# Patient Record
Sex: MALE | Race: WHITE | NOT HISPANIC OR LATINO | Employment: FULL TIME | URBAN - METROPOLITAN AREA
[De-identification: names, ages, dates, MRNs, and addresses within clinical notes are randomized per-mention and may not be internally consistent; named-entity substitution may affect disease eponyms.]

---
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
## 2023-04-10 PROBLEM — F31.81 BIPOLAR II DISORDER (MULTI): Status: ACTIVE | Noted: 2023-04-10

## 2023-04-10 PROBLEM — E66.01 CLASS 2 SEVERE OBESITY WITH SERIOUS COMORBIDITY AND BODY MASS INDEX (BMI) OF 36.0 TO 36.9 IN ADULT (MULTI): Status: ACTIVE | Noted: 2023-04-10

## 2023-04-10 PROBLEM — L60.8 PITTING OF NAILS: Status: ACTIVE | Noted: 2023-04-10

## 2023-04-10 PROBLEM — S39.012A STRAIN OF LUMBAR REGION: Status: ACTIVE | Noted: 2023-04-10

## 2023-04-10 PROBLEM — E66.812 CLASS 2 SEVERE OBESITY WITH SERIOUS COMORBIDITY AND BODY MASS INDEX (BMI) OF 36.0 TO 36.9 IN ADULT: Status: ACTIVE | Noted: 2023-04-10

## 2023-04-10 PROBLEM — L03.019 PARONYCHIA OF FINGER: Status: ACTIVE | Noted: 2023-04-10

## 2023-04-10 PROBLEM — R53.83 FATIGUE: Status: ACTIVE | Noted: 2023-04-10

## 2023-04-10 PROBLEM — S93.409A ANKLE SPRAIN: Status: ACTIVE | Noted: 2023-04-10

## 2023-04-10 PROBLEM — M25.579 ANKLE PAIN: Status: ACTIVE | Noted: 2023-04-10

## 2023-04-10 RX ORDER — MUPIROCIN 20 MG/G
OINTMENT TOPICAL 2 TIMES DAILY
COMMUNITY
Start: 2022-12-14

## 2023-04-10 RX ORDER — QUETIAPINE FUMARATE 400 MG/1
1 TABLET, FILM COATED ORAL NIGHTLY
COMMUNITY
End: 2023-11-08

## 2023-11-08 DIAGNOSIS — F31.10 BIPOLAR DISORDER, CURRENT EPISODE MANIC WITHOUT PSYCHOTIC FEATURES, UNSPECIFIED (MULTI): ICD-10-CM

## 2023-11-08 RX ORDER — QUETIAPINE FUMARATE 400 MG/1
400 TABLET, FILM COATED ORAL NIGHTLY
Qty: 90 TABLET | Refills: 3 | Status: SHIPPED | OUTPATIENT
Start: 2023-11-08

## 2024-11-10 DIAGNOSIS — F31.10 BIPOLAR DISORDER, CURRENT EPISODE MANIC WITHOUT PSYCHOTIC FEATURES, UNSPECIFIED (MULTI): ICD-10-CM

## 2024-11-11 RX ORDER — QUETIAPINE FUMARATE 400 MG/1
400 TABLET, FILM COATED ORAL NIGHTLY
Qty: 90 TABLET | Refills: 3 | Status: SHIPPED | OUTPATIENT
Start: 2024-11-11

## 2025-01-22 ENCOUNTER — APPOINTMENT (OUTPATIENT)
Dept: PRIMARY CARE | Facility: CLINIC | Age: 55
End: 2025-01-22
Payer: COMMERCIAL

## 2025-01-22 VITALS
WEIGHT: 295 LBS | SYSTOLIC BLOOD PRESSURE: 152 MMHG | OXYGEN SATURATION: 95 % | RESPIRATION RATE: 18 BRPM | HEART RATE: 62 BPM | DIASTOLIC BLOOD PRESSURE: 92 MMHG | BODY MASS INDEX: 36.68 KG/M2 | HEIGHT: 75 IN

## 2025-01-22 DIAGNOSIS — F43.10 PTSD (POST-TRAUMATIC STRESS DISORDER): Primary | ICD-10-CM

## 2025-01-22 DIAGNOSIS — J06.9 URI, ACUTE: ICD-10-CM

## 2025-01-22 DIAGNOSIS — F31.10 BIPOLAR DISORDER, CURRENT EPISODE MANIC WITHOUT PSYCHOTIC FEATURES, UNSPECIFIED (MULTI): ICD-10-CM
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
## 2025-01-22 PROBLEM — S93.409A ANKLE SPRAIN: Status: RESOLVED | Noted: 2023-04-10 | Resolved: 2025-01-22

## 2025-01-22 PROBLEM — L03.019 PARONYCHIA OF FINGER: Status: RESOLVED | Noted: 2023-04-10 | Resolved: 2025-01-22

## 2025-01-22 PROBLEM — M25.579 ANKLE PAIN: Status: RESOLVED | Noted: 2023-04-10 | Resolved: 2025-01-22

## 2025-01-22 PROCEDURE — 1036F TOBACCO NON-USER: CPT | Performed by: FAMILY MEDICINE

## 2025-01-22 PROCEDURE — 99213 OFFICE O/P EST LOW 20 MIN: CPT | Performed by: FAMILY MEDICINE

## 2025-01-22 PROCEDURE — 3008F BODY MASS INDEX DOCD: CPT | Performed by: FAMILY MEDICINE

## 2025-01-22 ASSESSMENT — PATIENT HEALTH QUESTIONNAIRE - PHQ9
1. LITTLE INTEREST OR PLEASURE IN DOING THINGS: NOT AT ALL
SUM OF ALL RESPONSES TO PHQ9 QUESTIONS 1 AND 2: 0
2. FEELING DOWN, DEPRESSED OR HOPELESS: NOT AT ALL

## 2025-01-22 NOTE — PROGRESS NOTES
"Subjective   Beni Galindo is a 54 y.o. male who presents for [facility].     Had a URI with cough, fever, body aches.  The whole family had it.  He missed work 1/8 - 1/11 and has now returned to work.  He needs [facility] paperwork filled out for this absence.  He states that his symptoms have resolved although he still has a little bit of chest congestion left over    We also reviewed his history of mood disorder.  He notes that after his time in the  he has a difficulty trusting people and feels paranoid.  Loud noises like sirens \"set him off\".  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  He sometimes has about past events.  All of his symptoms are improved with Seroquel but are not resolved.             Visit Vitals  BP (!) 152/92   Pulse 62   Resp 18      Objective   Physical Exam  Constitutional:       Appearance: Normal appearance.   HENT:      Head: Normocephalic.   Pulmonary:      Effort: Pulmonary effort is normal.   Musculoskeletal:      Cervical back: Neck supple.   Skin:     General: Skin is warm and dry.   Psychiatric:         Mood and Affect: Mood normal.       No data recorded     No data recorded   Patient Health Questionnaire-2 Score: 0 (1/22/2025  3:39 PM)   Assessment & Plan  PTSD (post-traumatic stress disorder)  On questioning today his current symptoms of hypervigilance, difficulty with trust, reliving the past, and nightmares are consistent with posttraumatic stress disorder related to his  service.  It seems that his symptoms are significantly improved with Seroquel and so we will certainly continue this.  There is question regarding the underlying diagnosis as he really does not give a history of any significant depression spells.  He had previously been diagnosed with bipolar disorder but it seems with our current information that PTSD may be the more prominent underlying disease process.       Bipolar disorder, current episode manic without psychotic features, " unspecified (Multi)         URI, acute  Based on description and exam today he has recovered from a viral URI.  He missed 4 days of work from January through January 11 has [facility] paperwork indicating this a copy was made for our chart              Please excuse any errors in grammar or translation related to this dictation. Voice recognition software was utilized to prepare this document.

## 2025-01-22 NOTE — ASSESSMENT & PLAN NOTE
On questioning today his current symptoms of hypervigilance, difficulty with trust, reliving the past, and nightmares are consistent with posttraumatic stress disorder related to his  service.  It seems that his symptoms are significantly improved with Seroquel and so we will certainly continue this.  There is question regarding the underlying diagnosis as he really does not give a history of any significant depression spells.  He had previously been diagnosed with bipolar disorder but it seems with our current information that PTSD may be the more prominent underlying disease process.        Body Location Override (Optional - Billing Will Still Be Based On Selected Body Map Location If Applicable): left mid helix